# Patient Record
Sex: MALE | URBAN - METROPOLITAN AREA
[De-identification: names, ages, dates, MRNs, and addresses within clinical notes are randomized per-mention and may not be internally consistent; named-entity substitution may affect disease eponyms.]

---

## 2018-10-22 ENCOUNTER — NURSE TRIAGE (OUTPATIENT)
Dept: NURSING | Facility: CLINIC | Age: 18
End: 2018-10-22

## 2018-10-22 NOTE — TELEPHONE ENCOUNTER
Very poor connection, unable to hear what caller was saying, eventually call dropped.    Maryanne Lorenzana RN  [facility]

## 2018-10-22 NOTE — TELEPHONE ENCOUNTER
Patient calling stating he had spoken with [facility] Nurse who advised him to go to [facility] ER now for anxiety and depression. Stating he was advised to call for cost information.  Phone number provided to patient for consumer price line.  Advised to follow recommendation of [facility] Nurse and have someone bring him to the Emergency Room now. Patient denies any suicidal thoughts or attempts.    Loli Swanson RN  [facility]